# Patient Record
Sex: FEMALE | Race: WHITE
[De-identification: names, ages, dates, MRNs, and addresses within clinical notes are randomized per-mention and may not be internally consistent; named-entity substitution may affect disease eponyms.]

---

## 2018-03-10 ENCOUNTER — HOSPITAL ENCOUNTER (EMERGENCY)
Dept: HOSPITAL 80 - FED | Age: 23
Discharge: HOME | End: 2018-03-10
Payer: COMMERCIAL

## 2018-03-10 VITALS
OXYGEN SATURATION: 99 % | RESPIRATION RATE: 16 BRPM | SYSTOLIC BLOOD PRESSURE: 122 MMHG | DIASTOLIC BLOOD PRESSURE: 77 MMHG

## 2018-03-10 VITALS — TEMPERATURE: 97.9 F

## 2018-03-10 VITALS — HEART RATE: 88 BPM

## 2018-03-10 DIAGNOSIS — F10.921: Primary | ICD-10-CM

## 2018-03-10 DIAGNOSIS — C81.90: ICD-10-CM

## 2018-03-10 DIAGNOSIS — E86.9: ICD-10-CM

## 2018-03-10 DIAGNOSIS — R11.2: ICD-10-CM

## 2018-03-10 LAB — PLATELET # BLD: 216 10^3/UL (ref 150–400)

## 2018-03-10 PROCEDURE — G0480 DRUG TEST DEF 1-7 CLASSES: HCPCS

## 2018-03-10 NOTE — EDPHY
H & P


Stated Complaint: ETOH


Time Seen by Provider: 03/10/18 01:29


HPI/ROS: 





CHIEF COMPLAINT:  Alcohol intoxication





HISTORY OF PRESENT ILLNESS: The patient is a university student.   Patient was 

found by bystanders to be severely intoxicated and therefore they called EMS 

system.   Patient denies any injuries, denies loss of consciousness, denies any 

recent trauma.   Patient denies coingestion, patient denies suicidal or 

homicidal behavior.  She did vomit.  Blood glucose in the field was 130. 








 REVIEW OF SYSTEMS:





Constitutional: No fever, no chills.


 Eyes:No visual changes.


 ENT: No sore throat.


 Respiratory: No cough, no shortness of breath.


 Cardiac: No chest pain.


 Gastrointestinal: No abdominal pain, vomiting or diarrhea.


 Genitourinary: No hematuria.


 Musculoskeletal: No back pain.


 Skin: No rashes.


 Neurological: No headache.





PAST MEDICAL HISTORY:  History of Hodgkin's lymphoma





PAST SURGICAL HISTORY: None





SOCIAL HISTORY: Student, single, denies tobacco or drug use, drinks alcohol 

occasionally





PHYSICAL EXAM:





General Appearance: Alert, well hydrated, appropriate, and non-toxic appearing.


 Head: Atraumatic without scalp tenderness or obvious injury


 Eyes: Pupils equal, round, reactive to light, no injection.


 Ears: Clear bilaterally, no perforation, normal landmarks


 Nose: Atraumatic, no rhinorrhea, clear.


 Throat:  mucus membranes moist.


 Neck: Supple, non-tender, no lymphadenopathy.


 Respiratory: No retractions, no distress, no wheezes, and no accessory muscle 

use.  Lungs are clear to auscultation bilaterally.


 Cardiovascular: Regular rate and rhythm, no murmurs, rubs, or gallops. 


 Gastrointestinal: Abdomen is soft, non-tender, non-distended


 Musculoskeletal: Normal active ROM of all extremities, atraumatic.


 Neurological: Alert, appropriate, and interactive.  Moves all extremities 

equally.


 Skin: No rashes, good turgor, no nodules on palpation.














 MEDICAL DECISION MAKING: I serially examined this patient since the patient's 

arrival here in the emergency department.   The patient continues to become 

more and more sober with each examination.  Patient's mother called in and 

requests we check labs given her history of Hodgkin's lymphoma.  I feel this is 

reasonable and have added on labs.  Blood alcohol level is 186, otherwise 

unremarkable.





I serially questioned the patient and the patient's story given initially has 

not changed.   The patient still denies any trauma, any head injury, and any 

illicit drug use.   At this point, the patient is walking the department freely 

and is clinically sober.   We're discharging the patient home with sober 

friends in stable condition.





Source: Patient, EMS


Exam Limitations: Intoxication





- Personal History


LMP (Females 10-55): 15-21 Days Ago


Current Tetanus/Diphtheria Vaccine: Yes


Current Tetanus Diphtheria and Acellular Pertussis (TDAP): Yes





- Medical/Surgical History


Hx Asthma: No


Hx Chronic Respiratory Disease: No


Hx Diabetes: No


Hx Cardiac Disease: No


Hx Renal Disease: No


Hx Cirrhosis: No


Hx Alcoholism: No


Hx HIV/AIDS: No


Hx Splenectomy or Spleen Trauma: No


Other PMH: lymphoma 2015





- Social History


Smoking Status: Never smoked


Constitutional: 


 Initial Vital Signs











Temperature (C)  36.6 C   03/10/18 01:27


 


Heart Rate  97   03/10/18 01:27


 


Respiratory Rate  16   03/10/18 01:27


 


Blood Pressure  128/81 H  03/10/18 01:27


 


O2 Sat (%)  98   03/10/18 01:27








 











O2 Delivery Mode               Room Air














Allergies/Adverse Reactions: 


 





amoxicillin Allergy (Verified 03/10/18 01:26)


 


Penicillins Allergy (Verified 03/10/18 01:26)


 


Sulfa (Sulfonamide Antibiotics) Allergy (Verified 03/10/18 01:26)


 








Home Medications: 














 Medication  Instructions  Recorded


 


Prednisone  03/10/18


 


Spironolactone  03/10/18














Medical Decision Making





- Data Points


Laboratory Results: 


 Laboratory Results





 03/10/18 01:55 





 03/10/18 01:55 





 











  03/10/18 03/10/18





  01:55 01:55


 


WBC    12.33 10^3/uL H 10^3/uL





    (3.80-9.50) 


 


RBC    5.19 10^6/uL 10^6/uL





    (4.18-5.33) 


 


Hgb    14.9 g/dL g/dL





    (12.6-16.3) 


 


Hct    44.2 % %





    (38.0-47.0) 


 


MCV    85.2 fL fL





    (81.5-99.8) 


 


MCH    28.7 pg pg





    (27.9-34.1) 


 


MCHC    33.7 g/dL g/dL





    (32.4-36.7) 


 


RDW    13.6 % %





    (11.5-15.2) 


 


Plt Count    216 10^3/uL 10^3/uL





    (150-400) 


 


MPV    10.0 fL fL





    (8.7-11.7) 


 


Neut % (Auto)    81.5 % H %





    (39.3-74.2) 


 


Lymph % (Auto)    13.5 % L %





    (15.0-45.0) 


 


Mono % (Auto)    4.1 % L %





    (4.5-13.0) 


 


Eos % (Auto)    0.2 % L %





    (0.6-7.6) 


 


Baso % (Auto)    0.3 % %





    (0.3-1.7) 


 


Nucleat RBC Rel Count    0.0 % %





    (0.0-0.2) 


 


Absolute Neuts (auto)    10.03 10^3/uL H 10^3/uL





    (1.70-6.50) 


 


Absolute Lymphs (auto)    1.67 10^3/uL 10^3/uL





    (1.00-3.00) 


 


Absolute Monos (auto)    0.51 10^3/uL 10^3/uL





    (0.30-0.80) 


 


Absolute Eos (auto)    0.03 10^3/uL 10^3/uL





    (0.03-0.40) 


 


Absolute Basos (auto)    0.04 10^3/uL 10^3/uL





    (0.02-0.10) 


 


Absolute Nucleated RBC    0.00 10^3/uL 10^3/uL





    (0-0.01) 


 


Immature Gran %    0.4 % %





    (0.0-1.1) 


 


Immature Gran #    0.05 10^3/uL 10^3/uL





    (0.00-0.10) 


 


Sodium  145 mEq/L mEq/L  





   (135-145)  


 


Potassium  4.8 mEq/L mEq/L  





   (3.5-5.2)  


 


Chloride  106 mEq/L mEq/L  





   ()  


 


Carbon Dioxide  20 mEq/l L mEq/l  





   (22-31)  


 


Anion Gap  19 mEq/L H mEq/L  





   (8-16)  


 


BUN  8 mg/dL mg/dL  





   (7-23)  


 


Creatinine  0.7 mg/dL mg/dL  





   (0.6-1.0)  


 


Estimated GFR  > 60   





   


 


Glucose  115 mg/dL H mg/dL  





   ()  


 


Calcium  9.2 mg/dL mg/dL  





   (8.5-10.4)  


 


Ethyl Alcohol  186 mg/dL H mg/dL  





   (0-10)  











Medications Given: 


 








Discontinued Medications





Sodium Chloride (Ns)  1,000 mls @ 0 mls/hr IV EDNOW ONE; Wide Open


   PRN Reason: Protocol


   Stop: 03/10/18 01:53


   Last Admin: 03/10/18 02:00 Dose:  1,000 mls








Departure





- Departure


Disposition: Home, Routine, Self-Care


Clinical Impression: 


Alcoholic intoxication


Qualifiers:


 Complication of substance-induced condition: with delirium Qualified Code(s): 

F10.921 - Alcohol use, unspecified with intoxication delirium





Vomiting


Qualifiers:


 Vomiting Intractability: non-intractable Nausea presence: with nausea 





Hodgkin's disease


Qualifiers:


 Hodgkin lymphoma type: unspecified type Lymphoma site: unspecified region 

Qualified Code(s): C81.90 - Hodgkin lymphoma, unspecified, unspecified site





Condition: Good


Instructions:  Alcohol Intoxication (ED)


Referrals: 


[facility] 24 Hours [Outside] - As per Instructions